# Patient Record
Sex: MALE | Race: WHITE
[De-identification: names, ages, dates, MRNs, and addresses within clinical notes are randomized per-mention and may not be internally consistent; named-entity substitution may affect disease eponyms.]

---

## 2023-01-01 ENCOUNTER — HOSPITAL ENCOUNTER (EMERGENCY)
Dept: HOSPITAL 75 - ER FS | Age: 63
Discharge: HOME | End: 2023-01-01
Payer: COMMERCIAL

## 2023-01-01 VITALS — WEIGHT: 235.23 LBS | BODY MASS INDEX: 33.68 KG/M2 | HEIGHT: 70 IN

## 2023-01-01 VITALS — SYSTOLIC BLOOD PRESSURE: 126 MMHG | DIASTOLIC BLOOD PRESSURE: 75 MMHG

## 2023-01-01 DIAGNOSIS — I10: ICD-10-CM

## 2023-01-01 DIAGNOSIS — M25.512: ICD-10-CM

## 2023-01-01 DIAGNOSIS — M25.511: Primary | ICD-10-CM

## 2023-01-01 LAB
ALBUMIN SERPL-MCNC: 4.6 GM/DL (ref 3.2–4.5)
ALP SERPL-CCNC: 52 U/L (ref 40–136)
ALT SERPL-CCNC: 35 U/L (ref 0–55)
APTT BLD: 28 SEC (ref 24–35)
BASOPHILS # BLD AUTO: 0 10^3/UL (ref 0–0.1)
BASOPHILS NFR BLD AUTO: 0 % (ref 0–10)
BILIRUB SERPL-MCNC: 0.8 MG/DL (ref 0.1–1)
BUN/CREAT SERPL: 15
CALCIUM SERPL-MCNC: 9.5 MG/DL (ref 8.5–10.1)
CHLORIDE SERPL-SCNC: 94 MMOL/L (ref 98–107)
CO2 SERPL-SCNC: 24 MMOL/L (ref 21–32)
CREAT SERPL-MCNC: 0.98 MG/DL (ref 0.6–1.3)
EOSINOPHIL # BLD AUTO: 0.2 10^3/UL (ref 0–0.3)
EOSINOPHIL NFR BLD AUTO: 2 % (ref 0–10)
GFR SERPLBLD BASED ON 1.73 SQ M-ARVRAT: 88 ML/MIN
GLUCOSE SERPL-MCNC: 113 MG/DL (ref 70–105)
HCT VFR BLD CALC: 46 % (ref 40–54)
HGB BLD-MCNC: 17.2 G/DL (ref 13.3–17.7)
INR PPP: 0.9 (ref 0.8–1.4)
LIPASE SERPL-CCNC: 31 U/L (ref 8–78)
LYMPHOCYTES # BLD AUTO: 3.2 10^3/UL (ref 1–4)
LYMPHOCYTES NFR BLD AUTO: 31 % (ref 12–44)
MAGNESIUM SERPL-MCNC: 2.1 MG/DL (ref 1.6–2.4)
MANUAL DIFFERENTIAL PERFORMED BLD QL: NO
MCH RBC QN AUTO: 32 PG (ref 25–34)
MCHC RBC AUTO-ENTMCNC: 37 G/DL (ref 32–36)
MCV RBC AUTO: 85 FL (ref 80–99)
MONOCYTES # BLD AUTO: 0.6 10^3/UL (ref 0–1)
MONOCYTES NFR BLD AUTO: 6 % (ref 0–12)
NEUTROPHILS # BLD AUTO: 6.2 10^3/UL (ref 1.8–7.8)
NEUTROPHILS NFR BLD AUTO: 60 % (ref 42–75)
PLATELET # BLD: 237 10^3/UL (ref 130–400)
PMV BLD AUTO: 9.1 FL (ref 9–12.2)
POTASSIUM SERPL-SCNC: 3.8 MMOL/L (ref 3.6–5)
PROT SERPL-MCNC: 7.5 GM/DL (ref 6.4–8.2)
PROTHROMBIN TIME: 12.7 SEC (ref 12.2–14.7)
SODIUM SERPL-SCNC: 130 MMOL/L (ref 135–145)
WBC # BLD AUTO: 10.2 10^3/UL (ref 4.3–11)

## 2023-01-01 PROCEDURE — 83880 ASSAY OF NATRIURETIC PEPTIDE: CPT

## 2023-01-01 PROCEDURE — 93041 RHYTHM ECG TRACING: CPT

## 2023-01-01 PROCEDURE — 80053 COMPREHEN METABOLIC PANEL: CPT

## 2023-01-01 PROCEDURE — 71045 X-RAY EXAM CHEST 1 VIEW: CPT

## 2023-01-01 PROCEDURE — 85610 PROTHROMBIN TIME: CPT

## 2023-01-01 PROCEDURE — 36415 COLL VENOUS BLD VENIPUNCTURE: CPT

## 2023-01-01 PROCEDURE — 93005 ELECTROCARDIOGRAM TRACING: CPT

## 2023-01-01 PROCEDURE — 85730 THROMBOPLASTIN TIME PARTIAL: CPT

## 2023-01-01 PROCEDURE — 83735 ASSAY OF MAGNESIUM: CPT

## 2023-01-01 PROCEDURE — 83690 ASSAY OF LIPASE: CPT

## 2023-01-01 PROCEDURE — 84484 ASSAY OF TROPONIN QUANT: CPT

## 2023-01-01 PROCEDURE — 85025 COMPLETE CBC W/AUTO DIFF WBC: CPT

## 2023-01-01 NOTE — DIAGNOSTIC IMAGING REPORT
EXAMINATION: Chest 1 view.



HISTORY: Shoulder pain.



COMPARISON: None available.



FINDINGS: The lungs are clear without edema or pneumonia. No

pleural effusion or pneumothorax. Heart size is normal.



IMPRESSION: Clear lungs. 



Dictated by: 



  Dictated on workstation # [id number]

## 2023-01-01 NOTE — ED GENERAL
General


Chief Complaint:  General Problems/Pain


Stated Complaint:  ELEV BP; DAISY SHOULDER PN


Nursing Triage Note:  


Patient presents to the ED with c/o elevated blood pressure and bilateral 


shoulder pain. Reports shoulder pain has been ongoing for a couple of weeks 


without any known injury. States he wasn't feeling well today so took his blood 


pressure and his heart rate was 104 and his blood pressure ranged from 150/80s 


to 160/90s.


Source of Information:  Patient





History of Present Illness


Date Seen by Provider:  2023


Time Seen by Provider:  14:26


Initial Comments


62-year-old male presenting with complaints of bilateral shoulder pain and 

elevated blood pressure for the last several weeks.  He felt like he was having 

problems today and did not feel well so he checked his vital signs at home and 

had elevated blood pressure and heart rate.  He has an appointment to be seen in

the clinic at the end of this week.  However with his symptoms feeling worse 

today he felt like he could not wait until Friday to be seen so he came to the 

emergency department.  He denies having any nausea, vomiting, shortness of 

breath, headache, change in vision.  He feels like the pain in his shoulders 

improves when he lays on the floor and pops his back.  He denies any acute 

trauma or injury.


Timing/Duration:  Intermittent (Over the last several weeks but worse today)


Severity:  Moderate


Associated Systoms:  No Chest Pain, No Cough, No Diaphoresis, No Fever/Chills, 

No Headaches, No Loss of Appetite, No Malaise, No Nausea/Vomiting, No Rash, No 

Seizure, No Shortness of Air, No Syncope, No Weakness





Allergies and Home Medications


Allergies


Coded Allergies:  


     No Known Drug Allergies (Unverified , 23)





Patient Home Medication List


Home Medication List Reviewed:  Yes





Review of Systems


Review of Systems


Constitutional:  No chills, No diaphoresis, No dizziness, No fever


EENTM:  No vision loss, No epistaxis, No nose congestion


Respiratory:  No cough, No short of breath


Cardiovascular:  No chest pain, No edema, No palpitations


Gastrointestinal:  No abdominal pain, No nausea, No vomiting


Genitourinary:  No dysuria, No hematuria


Musculoskeletal:  joint pain (Bilateral shoulders)


Skin:  No change in color, No rash


Psychiatric/Neurological:  Denies Headache





Past Medical-Social-Family Hx


Patient Social History


Tobacco Use?:  No


Use of E-Cig and/or Vaping dev:  No


Substance use?:  No


Alcohol Use?:  No


Pt feels they are or have been:  No





Immunizations Up To Date


First/Initial COVID19 Vaccinat:  Yes


Second COVID19 Vaccination Deny:  Yes





Past Medical History


Surgery/Hospitalization HX:  


HTN





Physical Exam


Vital Signs





Vital Signs - First Documented








 23





 14:24


 


Temp 36.7


 


Pulse 113


 


Resp 18


 


B/P (MAP) 177/89 (118)


 


Pulse Ox 99


 


O2 Delivery Room Air





Capillary Refill : NONE


Height, Weight, BMI


Height: '"


Weight: lbs. oz. kg; 33.00 BMI


Method:


General Appearance:  No Apparent Distress, Obese


Eyes:  Bilateral Eye PERRL, Bilateral Eye EOMI


HEENT:  Normal ENT Inspection, Pharynx Normal


Neck:  Full Range of Motion, Normal Inspection, Non Tender, Supple


Respiratory:  Chest Non Tender, Lungs Clear, Normal Breath Sounds, No Accessory 

Muscle Use, No Respiratory Distress


Cardiovascular:  Regular Rate, Rhythm, Normal Peripheral Pulses


Gastrointestinal:  Normal Bowel Sounds, No Pulsatile Mass, Non Tender, Soft


Rectal:  Deferred


Back:  No CVA Tenderness, No Vertebral Tenderness


Extremity:  Normal Capillary Refill, Normal Inspection, Normal Range of Motion, 

Non Tender, No Pedal Edema


Neurologic/Psychiatric:  Alert, Oriented x3, CNs II-XII Norm as Tested


Skin:  Normal Color, Warm/Dry





Progress/Results/Core Measures


Suspected Sepsis


SIRS


Temperature: 


Pulse: 113 


Respiratory Rate: 18


 


Laboratory Tests


23 14:30: White Blood Count 10.2


Blood Pressure 177 /89 


Mean: 118


 


Laboratory Tests


23 14:30: 


Creatinine 0.98, INR Comment 0.9, Platelet Count 237, Total Bilirubin 0.8








Results/Orders


Lab Results





Laboratory Tests








Test


 23


14:30 Range/Units


 


 


White Blood Count


 10.2 


 4.3-11.0


10^3/uL


 


Red Blood Count


 5.45 


 4.30-5.52


10^6/uL


 


Hemoglobin 17.2  13.3-17.7  g/dL


 


Hematocrit 46  40-54  %


 


Mean Corpuscular Volume 85  80-99  fL


 


Mean Corpuscular Hemoglobin 32  25-34  pg


 


Mean Corpuscular Hemoglobin


Concent 37 H


 32-36  g/dL





 


Red Cell Distribution Width 11.7  10.0-14.5  %


 


Platelet Count


 237 


 130-400


10^3/uL


 


Mean Platelet Volume 9.1  9.0-12.2  fL


 


Immature Granulocyte % (Auto) 0   %


 


Neutrophils (%) (Auto) 60  42-75  %


 


Lymphocytes (%) (Auto) 31  12-44  %


 


Monocytes (%) (Auto) 6  0-12  %


 


Eosinophils (%) (Auto) 2  0-10  %


 


Basophils (%) (Auto) 0  0-10  %


 


Neutrophils # (Auto)


 6.2 


 1.8-7.8


10^3/uL


 


Lymphocytes # (Auto)


 3.2 


 1.0-4.0


10^3/uL


 


Monocytes # (Auto)


 0.6 


 0.0-1.0


10^3/uL


 


Eosinophils # (Auto)


 0.2 


 0.0-0.3


10^3/uL


 


Basophils # (Auto)


 0.0 


 0.0-0.1


10^3/uL


 


Immature Granulocyte # (Auto)


 0.0 


 0.0-0.1


10^3/uL


 


Prothrombin Time 12.7  12.2-14.7  SEC


 


INR Comment 0.9  0.8-1.4  


 


Activated Partial


Thromboplast Time 28 


 24-35  SEC





 


Sodium Level 130 L 135-145  MMOL/L


 


Potassium Level 3.8  3.6-5.0  MMOL/L


 


Chloride Level 94 L   MMOL/L


 


Carbon Dioxide Level 24  21-32  MMOL/L


 


Anion Gap 12  5-14  MMOL/L


 


Blood Urea Nitrogen 15  7-18  MG/DL


 


Creatinine


 0.98 


 0.60-1.30


MG/DL


 


Estimat Glomerular Filtration


Rate 88 


  





 


BUN/Creatinine Ratio 15   


 


Glucose Level 113 H   MG/DL


 


Calcium Level 9.5  8.5-10.1  MG/DL


 


Corrected Calcium   8.5-10.1  MG/DL


 


Magnesium Level 2.1  1.6-2.4  MG/DL


 


Total Bilirubin 0.8  0.1-1.0  MG/DL


 


Aspartate Amino Transf


(AST/SGOT) 24 


 5-34  U/L





 


Alanine Aminotransferase


(ALT/SGPT) 35 


 0-55  U/L





 


Alkaline Phosphatase 52    U/L


 


Troponin I < 0.30  <0.30  NG/ML


 


Pro-B-Type Natriuretic Peptide 36.1  <125.0  PG/ML


 


Total Protein 7.5  6.4-8.2  GM/DL


 


Albumin 4.6 H 3.2-4.5  GM/DL


 


Lipase 31  8-78  U/L








My Orders





Orders - DIXON CARTER MD


Ekg Tracing (23 14:28)


Monitor-Rhythm Ecg Trace Only (23 14:28)


Cbc With Automated Diff (23 14:39)


Magnesium (23 14:39)


Chest 1 View Ap/Pa Only (23 14:39)


Comprehensive Metabolic Panel (23 14:39)


Protime With Inr (23 14:39)


Partial Thromboplastin Time (23 14:39)


O2 (23 14:39)


Ed Iv/Invasive Line Start (23 14:39)


Lipase (23 14:39)


Troponin I Fs (23 14:39)


Probnp Fs (23 14:39)


Hydralazine Injection (Apresoline Inject (23 14:39)


Ns Iv 1000 Ml (Sodium Chloride 0.9%) (23 14:39)





Vital Signs/I&O











 23





 14:24 15:56


 


Temp 36.7 36.7


 


Pulse 113 90


 


Resp 18 18


 


B/P (MAP) 177/89 (118) 126/75


 


Pulse Ox 99 99


 


O2 Delivery Room Air Room Air





Capillary Refill : NONE








Blood Pressure Mean:                    118








Progress Note #1:  


Progress Note


Obtain labs to evaluate his blood count as well as electrolytes and cardiac 

enzymes.  Electrocardiogram since he was complaining of high blood pressure and 

bilateral shoulder pain.  Give hydralazine 10 mg IV to help with blood pressure.


His electrocardiogram did not show acute ST elevation.


Progress Note #2:  


Progress Note


Blood pressure significantly improved with treatment in the ED.  His chest x-ray

and electrocardiogram are benign without acute process.  His labs all appeared 

stable without acute significant abnormality.  Specifically his cardiac enzymes 

were negative for acute coronary syndrome her ischemia considering he was having

symptoms all day and off and on over the last few weeks.  Reassured patient and 

advised that the clinic may adjust his medicines and he should call tomorrow to 

see if they can move up his appointment.  In the meantime keep a log of his 

blood pressures and follow a DASH diet in addition to taking his medication.





ECG


Initial ECG Impression Date:  2023


Initial ECG Impression Time:  14:34


Initial ECG Rate:  102


Initial ECG Rhythm:  S.Tach


Initial ECG Comparisson:  No Previous ECG Available


Comment


On my personal interpretation and review of his electrocardiogram he has sinus 

tachycardia with a heart rate of 102 bpm.  He has no acute ST elevation.  GA 

interval 199 ms.  QT interval 342 ms with a QTc interval 401 ms.  He has no 

prior tracing available for comparison.





Diagnostic Imaging





   Diagonstic Imaging:  Xray


   Plain Films/CT/US/NM/MRI:  chest


Comments


                 [facility] [place], [place]





NAME:   MARIE MCMULLEN


MED REC#:   [id number]


ACCOUNT#:   [id number]


PT STATUS:   DEP ER


:   1960


PHYSICIAN:   DIXON CARTER MD


ADMIT DATE:   23/ER FS


                                  ***Signed***


Date of Exam:23





CHEST 1 VIEW AP/PA ONLY








EXAMINATION: Chest 1 view.





HISTORY: Shoulder pain.





COMPARISON: None available.





FINDINGS: The lungs are clear without edema or pneumonia. No


pleural effusion or pneumothorax. Heart size is normal.





IMPRESSION: Clear lungs. 





Dictated by: 





  Dictated on workstation # [id number]








Dict:   23 1455


Trans:   23 1606


[facility] [id number]





Interpreted by:     MARCIANO THOMPSON MD


Electronically signed by: MARCIANO THOMPSON MD 23 1606


   Reviewed:  Reviewed by Me





Departure


Impression





   Primary Impression:  


   Labile hypertension


   Additional Impression:  


   Bilateral shoulder pain


   Qualified Codes:  M25.511 - Pain in right shoulder; M25.512 - Pain in left 

   shoulder; G89.29 - Other chronic pain


Disposition:   HOME, SELF-CARE


Condition:  Stable





Departure-Patient Inst.


Decision time for Depature:  15:49


Referrals:  


ARLENE LEBLANC MD (PCP)


Primary Care Physician


Patient Instructions:  DASH Diet, High Blood Pressure ED, Shoulder Pain ED





Add. Discharge Instructions:  


Keep appointment with the clinic this week. 


Keep a log of your blood pressures so that you can review them with the clinic. 





Continue taking your blood pressure medicine. Follow  a DASH diet to try and 

help with your blood pressure while waiting to get in with clinic this week. 





All discharge instructions reviewed with patient and/or family. Voiced 

understanding.











DIXON CARTER MD                2023 15:51